# Patient Record
Sex: FEMALE | Race: BLACK OR AFRICAN AMERICAN | NOT HISPANIC OR LATINO | Employment: UNEMPLOYED | URBAN - METROPOLITAN AREA
[De-identification: names, ages, dates, MRNs, and addresses within clinical notes are randomized per-mention and may not be internally consistent; named-entity substitution may affect disease eponyms.]

---

## 2023-06-08 DIAGNOSIS — D72.829 LEUCOCYTOSIS: Primary | ICD-10-CM

## 2023-07-14 ENCOUNTER — OFFICE VISIT (OUTPATIENT)
Dept: HEMATOLOGY/ONCOLOGY | Facility: CLINIC | Age: 63
End: 2023-07-14
Payer: MEDICAID

## 2023-07-14 VITALS
HEIGHT: 65 IN | TEMPERATURE: 98 F | DIASTOLIC BLOOD PRESSURE: 73 MMHG | HEART RATE: 66 BPM | RESPIRATION RATE: 20 BRPM | SYSTOLIC BLOOD PRESSURE: 121 MMHG | WEIGHT: 235 LBS | OXYGEN SATURATION: 97 % | BODY MASS INDEX: 39.15 KG/M2

## 2023-07-14 DIAGNOSIS — D72.829 LEUCOCYTOSIS: Primary | ICD-10-CM

## 2023-07-14 PROCEDURE — 3074F PR MOST RECENT SYSTOLIC BLOOD PRESSURE < 130 MM HG: ICD-10-PCS | Mod: CPTII,,, | Performed by: INTERNAL MEDICINE

## 2023-07-14 PROCEDURE — 1160F PR REVIEW ALL MEDS BY PRESCRIBER/CLIN PHARMACIST DOCUMENTED: ICD-10-PCS | Mod: CPTII,,, | Performed by: INTERNAL MEDICINE

## 2023-07-14 PROCEDURE — 3078F PR MOST RECENT DIASTOLIC BLOOD PRESSURE < 80 MM HG: ICD-10-PCS | Mod: CPTII,,, | Performed by: INTERNAL MEDICINE

## 2023-07-14 PROCEDURE — 3008F BODY MASS INDEX DOCD: CPT | Mod: CPTII,,, | Performed by: INTERNAL MEDICINE

## 2023-07-14 PROCEDURE — 3008F PR BODY MASS INDEX (BMI) DOCUMENTED: ICD-10-PCS | Mod: CPTII,,, | Performed by: INTERNAL MEDICINE

## 2023-07-14 PROCEDURE — 99203 OFFICE O/P NEW LOW 30 MIN: CPT | Mod: ,,, | Performed by: INTERNAL MEDICINE

## 2023-07-14 PROCEDURE — 1160F RVW MEDS BY RX/DR IN RCRD: CPT | Mod: CPTII,,, | Performed by: INTERNAL MEDICINE

## 2023-07-14 PROCEDURE — 1159F MED LIST DOCD IN RCRD: CPT | Mod: CPTII,,, | Performed by: INTERNAL MEDICINE

## 2023-07-14 PROCEDURE — 4010F PR ACE/ARB THEARPY RXD/TAKEN: ICD-10-PCS | Mod: CPTII,,, | Performed by: INTERNAL MEDICINE

## 2023-07-14 PROCEDURE — 4010F ACE/ARB THERAPY RXD/TAKEN: CPT | Mod: CPTII,,, | Performed by: INTERNAL MEDICINE

## 2023-07-14 PROCEDURE — 1159F PR MEDICATION LIST DOCUMENTED IN MEDICAL RECORD: ICD-10-PCS | Mod: CPTII,,, | Performed by: INTERNAL MEDICINE

## 2023-07-14 PROCEDURE — 3074F SYST BP LT 130 MM HG: CPT | Mod: CPTII,,, | Performed by: INTERNAL MEDICINE

## 2023-07-14 PROCEDURE — 99203 PR OFFICE/OUTPT VISIT, NEW, LEVL III, 30-44 MIN: ICD-10-PCS | Mod: ,,, | Performed by: INTERNAL MEDICINE

## 2023-07-14 PROCEDURE — 3078F DIAST BP <80 MM HG: CPT | Mod: CPTII,,, | Performed by: INTERNAL MEDICINE

## 2023-07-14 RX ORDER — TRAZODONE HYDROCHLORIDE 50 MG/1
50 TABLET ORAL NIGHTLY PRN
COMMUNITY
Start: 2023-05-06

## 2023-07-14 RX ORDER — EMPAGLIFLOZIN 10 MG/1
10 TABLET, FILM COATED ORAL DAILY
COMMUNITY
Start: 2023-06-06

## 2023-07-14 RX ORDER — NAPROXEN SODIUM 220 MG/1
81 TABLET, FILM COATED ORAL DAILY
COMMUNITY

## 2023-07-14 RX ORDER — METFORMIN HYDROCHLORIDE 850 MG/1
850 TABLET ORAL 2 TIMES DAILY
COMMUNITY
Start: 2023-05-28

## 2023-07-14 RX ORDER — METOPROLOL TARTRATE 25 MG/1
25 TABLET, FILM COATED ORAL 2 TIMES DAILY
COMMUNITY
Start: 2023-05-31

## 2023-07-14 RX ORDER — GABAPENTIN 100 MG/1
100 CAPSULE ORAL 3 TIMES DAILY
COMMUNITY
Start: 2023-05-31

## 2023-07-14 RX ORDER — CITALOPRAM 40 MG/1
40 TABLET, FILM COATED ORAL DAILY
COMMUNITY
Start: 2023-05-06

## 2023-07-14 RX ORDER — GLIPIZIDE 5 MG/1
5 TABLET ORAL DAILY
COMMUNITY
Start: 2023-06-05

## 2023-07-14 RX ORDER — ATORVASTATIN CALCIUM 10 MG/1
10 TABLET, FILM COATED ORAL DAILY
COMMUNITY
Start: 2023-06-14

## 2023-07-14 RX ORDER — LINAGLIPTIN 5 MG/1
5 TABLET, FILM COATED ORAL DAILY
COMMUNITY
Start: 2023-05-30 | End: 2023-11-15

## 2023-07-14 RX ORDER — CHOLECALCIFEROL (VITAMIN D3) 25 MCG
1000 TABLET ORAL DAILY
COMMUNITY

## 2023-07-14 RX ORDER — SULINDAC 200 MG/1
200 TABLET ORAL 2 TIMES DAILY
COMMUNITY
Start: 2023-06-12

## 2023-07-14 RX ORDER — TAMOXIFEN CITRATE 10 MG/1
10 TABLET ORAL DAILY
COMMUNITY
Start: 2023-05-31

## 2023-07-14 RX ORDER — LOSARTAN POTASSIUM 50 MG/1
50 TABLET ORAL DAILY
COMMUNITY
Start: 2023-06-06

## 2023-07-14 RX ORDER — AMLODIPINE BESYLATE 10 MG/1
10 TABLET ORAL DAILY
COMMUNITY
Start: 2023-06-01

## 2023-07-14 RX ORDER — LANCETS 33 GAUGE
1 EACH MISCELLANEOUS DAILY
COMMUNITY
Start: 2023-02-15

## 2023-07-14 NOTE — PROGRESS NOTES
PATIENT: Calista Mckinnon  MRN: [id number]  DATE: 7/14/2023        Chief Complaint: Follow-up (Not complaints )      Oncologic History:      History     Treatment     Pathology     62yo presents for evaluation of mild lymphocytosis. She has not unexplained weight loss and no night sweats. She had no illnesses or infections at the time the CBCs were drawn. She has should pain and knee pain.Sh notes no adenopathy. She has occasional abdominal pain for which she takes an antacid which relieves it. Last colonoscopy 2019; no polyps then but had polyps on the previous colonoscopy.   She quit smoking in 2014. No history of splenectomy. She did have breast cancer in 2018, treated in [place]. She did not require radiation or antihormone; told it was early detection.     Labs:  6/7/23 WBC 12.2, HGB 12.5, MCV 88, , ALC 3.8  5/5/23 WBC 11.6, HGB 12.0, HGB 38.7, MCV 86, PLT 267K ALC 3.9, ANC 6.8, CMP unremarkable    Subjective:   Interval History: Ms. Mckinnon is a 63 y.o. female who returns for new evaluation and treatment of. lymphocytosis     Past Medical History:   Past Medical History:   Diagnosis Date    Breast cancer     COPD (chronic obstructive pulmonary disease)     Essential (primary) hypertension     Gastritis without bleeding     History of right breast cancer     Mixed hyperlipidemia     MAKENZIE (obstructive sleep apnea)     Type 2 diabetes mellitus with diabetic neuropathy, without long-term current use of insulin        Past Surgical HIstory: History reviewed. No pertinent surgical history.    Family History:   Family History   Problem Relation Age of Onset    Hypertension Mother     Hypertension Brother     Hypertension Maternal Grandmother     Breast cancer Daughter     Hypertension Daughter        Social History:  reports that she has never smoked. She has never used smokeless tobacco. She reports that she does not currently use alcohol. She reports that she does not use drugs.    Allergies:  Review of  "patient's allergies indicates:  No Known Allergies    Medications:  Current Outpatient Medications   Medication Sig Dispense Refill    amLODIPine (NORVASC) 10 MG tablet Take 10 mg by mouth Daily.      aspirin 81 MG Chew Take 81 mg by mouth Daily.      atorvastatin (LIPITOR) 10 MG tablet Take 10 mg by mouth Daily.      citalopram (CELEXA) 40 MG tablet Take 40 mg by mouth Daily.      gabapentin (NEURONTIN) 100 MG capsule Take 100 mg by mouth 3 (three) times daily.      glipiZIDE (GLUCOTROL) 5 MG tablet Take 5 mg by mouth Daily.      JARDIANCE 10 mg tablet Take 10 mg by mouth Daily.      losartan (COZAAR) 50 MG tablet Take 50 mg by mouth Daily.      metFORMIN (GLUCOPHAGE) 850 MG tablet Take 850 mg by mouth 2 (two) times a day.      metoprolol tartrate (LOPRESSOR) 25 MG tablet Take 25 mg by mouth 2 (two) times daily.      sulindac (CLINORIL) 200 MG Tab Take 200 mg by mouth 2 (two) times a day.      tamoxifen (NOLVADEX) 10 MG Tab Take 10 mg by mouth Daily.      TRADJENTA 5 mg Tab tablet Take 5 mg by mouth Daily.      traZODone (DESYREL) 50 MG tablet Take 50 mg by mouth nightly as needed.      TRUEPLUS LANCETS 33 gauge Misc Apply 1 lancet  topically Daily.      vitamin D (VITAMIN D3) 1000 units Tab Take 1,000 Units by mouth once daily.       No current facility-administered medications for this visit.       Review of Systems    ECOG Performance Status:   ECOG SCORE             Objective:      Vitals:   Vitals:    07/14/23 1138   BP: 121/73   BP Location: Left arm   Patient Position: Sitting   BP Method: Medium (Automatic)   Pulse: 66   Resp: 20   Temp: 98.4 °F (36.9 °C)   TempSrc: Oral   SpO2: 97%   Weight: 106.6 kg (235 lb)   Height: 5' 5" (1.651 m)     BMI: Body mass index is 39.11 kg/m².    Physical Exam    Laboratory Data:  No visits with results within 1 Week(s) from this visit.   Latest known visit with results is:   No results found for any previous visit.         Imaging:   Assessment:     1. Lymphocytosis    2. " Leucocytosis          Plan:     Problem List Items Addressed This Visit    None  Visit Diagnoses       Lymphocytosis    -  Primary    Leucocytosis            Peripheral blood flow cytometry for leukemia/lymphoma to assess for monoclonal vs polyclonal, ESR and CRP. RTC in 2-3 weeks to go over the results

## 2023-08-15 ENCOUNTER — OFFICE VISIT (OUTPATIENT)
Dept: HEMATOLOGY/ONCOLOGY | Facility: CLINIC | Age: 63
End: 2023-08-15
Payer: MEDICAID

## 2023-08-15 VITALS
HEART RATE: 78 BPM | OXYGEN SATURATION: 97 % | BODY MASS INDEX: 39.15 KG/M2 | TEMPERATURE: 98 F | HEIGHT: 65 IN | RESPIRATION RATE: 20 BRPM | SYSTOLIC BLOOD PRESSURE: 156 MMHG | WEIGHT: 235 LBS | DIASTOLIC BLOOD PRESSURE: 78 MMHG

## 2023-08-15 DIAGNOSIS — D72.829 LEUKOCYTOSIS, UNSPECIFIED TYPE: Primary | ICD-10-CM

## 2023-08-15 PROCEDURE — 1159F MED LIST DOCD IN RCRD: CPT | Mod: CPTII,,, | Performed by: INTERNAL MEDICINE

## 2023-08-15 PROCEDURE — 3008F PR BODY MASS INDEX (BMI) DOCUMENTED: ICD-10-PCS | Mod: CPTII,,, | Performed by: INTERNAL MEDICINE

## 2023-08-15 PROCEDURE — 3077F PR MOST RECENT SYSTOLIC BLOOD PRESSURE >= 140 MM HG: ICD-10-PCS | Mod: CPTII,,, | Performed by: INTERNAL MEDICINE

## 2023-08-15 PROCEDURE — 4010F PR ACE/ARB THEARPY RXD/TAKEN: ICD-10-PCS | Mod: CPTII,,, | Performed by: INTERNAL MEDICINE

## 2023-08-15 PROCEDURE — 1160F PR REVIEW ALL MEDS BY PRESCRIBER/CLIN PHARMACIST DOCUMENTED: ICD-10-PCS | Mod: CPTII,,, | Performed by: INTERNAL MEDICINE

## 2023-08-15 PROCEDURE — 3078F PR MOST RECENT DIASTOLIC BLOOD PRESSURE < 80 MM HG: ICD-10-PCS | Mod: CPTII,,, | Performed by: INTERNAL MEDICINE

## 2023-08-15 PROCEDURE — 99213 PR OFFICE/OUTPT VISIT, EST, LEVL III, 20-29 MIN: ICD-10-PCS | Mod: ,,, | Performed by: INTERNAL MEDICINE

## 2023-08-15 PROCEDURE — 1160F RVW MEDS BY RX/DR IN RCRD: CPT | Mod: CPTII,,, | Performed by: INTERNAL MEDICINE

## 2023-08-15 PROCEDURE — 3077F SYST BP >= 140 MM HG: CPT | Mod: CPTII,,, | Performed by: INTERNAL MEDICINE

## 2023-08-15 PROCEDURE — 99213 OFFICE O/P EST LOW 20 MIN: CPT | Mod: ,,, | Performed by: INTERNAL MEDICINE

## 2023-08-15 PROCEDURE — 3078F DIAST BP <80 MM HG: CPT | Mod: CPTII,,, | Performed by: INTERNAL MEDICINE

## 2023-08-15 PROCEDURE — 3008F BODY MASS INDEX DOCD: CPT | Mod: CPTII,,, | Performed by: INTERNAL MEDICINE

## 2023-08-15 PROCEDURE — 1159F PR MEDICATION LIST DOCUMENTED IN MEDICAL RECORD: ICD-10-PCS | Mod: CPTII,,, | Performed by: INTERNAL MEDICINE

## 2023-08-15 PROCEDURE — 4010F ACE/ARB THERAPY RXD/TAKEN: CPT | Mod: CPTII,,, | Performed by: INTERNAL MEDICINE

## 2023-08-15 NOTE — PROGRESS NOTES
[facility]  PROGRESS NOTE    Patient ID: Calista Mckinnon is a 63 y.o. female.  MRN: [id number]  : 1960    Subjective:      Chief Complaint: Follow-up (No complaints)    History of Present Illness:   She is she back here for first-time in office visit justin was seen repeat telemedicine the few weeks ago for lymphocytosis she is here to come to go over the test results  History:  Past Medical History:   Diagnosis Date    Breast cancer     COPD (chronic obstructive pulmonary disease)     Essential (primary) hypertension     Gastritis without bleeding     History of right breast cancer     Mixed hyperlipidemia     MAKENZIE (obstructive sleep apnea)     Type 2 diabetes mellitus with diabetic neuropathy, without long-term current use of insulin       History reviewed. No pertinent surgical history.  Family History   Problem Relation Age of Onset    Hypertension Mother     Hypertension Brother     Hypertension Maternal Grandmother     Breast cancer Daughter     Hypertension Daughter       Social History     Tobacco Use    Smoking status: Never    Smokeless tobacco: Never   Substance and Sexual Activity    Alcohol use: Not Currently    Drug use: Never    Sexual activity: Not on file      Allergies: Review of patient's allergies indicates:  No Known Allergies    Current medications:  Medication List with Changes/Refills   Current Medications    AMLODIPINE (NORVASC) 10 MG TABLET    Take 10 mg by mouth Daily.    ASPIRIN 81 MG CHEW    Take 81 mg by mouth Daily.    ATORVASTATIN (LIPITOR) 10 MG TABLET    Take 10 mg by mouth Daily.    CITALOPRAM (CELEXA) 40 MG TABLET    Take 40 mg by mouth Daily.    GABAPENTIN (NEURONTIN) 100 MG CAPSULE    Take 100 mg by mouth 3 (three) times daily.    GLIPIZIDE (GLUCOTROL) 5 MG TABLET    Take 5 mg by mouth Daily.    JARDIANCE 10 MG TABLET    Take 10 mg by mouth Daily.    LOSARTAN (COZAAR) 50 MG TABLET    Take 50 mg by mouth Daily.    METFORMIN (GLUCOPHAGE) 850 MG TABLET    Take 850 mg by  mouth 2 (two) times a day.    METOPROLOL TARTRATE (LOPRESSOR) 25 MG TABLET    Take 25 mg by mouth 2 (two) times daily.    SULINDAC (CLINORIL) 200 MG TAB    Take 200 mg by mouth 2 (two) times a day.    TAMOXIFEN (NOLVADEX) 10 MG TAB    Take 10 mg by mouth Daily.    TRADJENTA 5 MG TAB TABLET    Take 5 mg by mouth Daily.    TRAZODONE (DESYREL) 50 MG TABLET    Take 50 mg by mouth nightly as needed.    TRUEPLUS LANCETS 33 GAUGE MISC    Apply 1 lancet  topically Daily.    VITAMIN D (VITAMIN D3) 1000 UNITS TAB    Take 1,000 Units by mouth once daily.      ROS:   Review of Systems   Constitutional:  Negative for activity change, appetite change, chills, fatigue, fever and unexpected weight change.   HENT:  Negative for nasal congestion, facial swelling, hearing loss, mouth sores, nosebleeds, sore throat, trouble swallowing and voice change.    Eyes:  Negative for photophobia, pain, redness, itching and visual disturbance.   Respiratory:  Negative for apnea, cough, choking, chest tightness, shortness of breath and wheezing.    Cardiovascular:  Negative for chest pain, palpitations, leg swelling and claudication.   Gastrointestinal:  Negative for abdominal distention, abdominal pain, anal bleeding, blood in stool, change in bowel habit, constipation, diarrhea, nausea, rectal pain, vomiting, reflux and change in bowel habit.   Endocrine: Negative for cold intolerance and heat intolerance.   Genitourinary:  Negative for decreased urine volume, difficulty urinating, dysuria, enuresis, flank pain, frequency, hematuria, urgency and vaginal dryness.   Musculoskeletal:  Negative for arthralgias, back pain, gait problem, joint swelling, leg pain, myalgias and neck pain.   Integumentary:  Negative for color change, rash, wound, mole/lesion, breast mass, breast discharge and breast tenderness.   Allergic/Immunologic: Negative for food allergies and immunocompromised state.   Neurological:  Negative for dizziness, vertigo, tremors,  "seizures, syncope, facial asymmetry, speech difficulty, weakness, light-headedness, numbness, headaches, coordination difficulties, memory loss and coordination difficulties.   Hematological:  Negative for adenopathy. Does not bruise/bleed easily.   Psychiatric/Behavioral:  Negative for agitation, confusion, sleep disturbance and suicidal ideas. The patient is not nervous/anxious.    Breast: Negative for mass and tenderness    Objective:     Vitals:    08/15/23 0914   BP: (!) 156/78   Pulse: 78   Resp: 20   Temp: 97.6 °F (36.4 °C)   TempSrc: Oral   SpO2: 97%   Weight: 106.6 kg (235 lb)   Height: 5' 5" (1.651 m)   PainSc:   7   PainLoc: Shoulder     Wt Readings from Last 2 Encounters:   08/15/23 106.6 kg (235 lb)   07/14/23 106.6 kg (235 lb)     Physical Examination:   Physical Exam  Vitals and nursing note reviewed. Exam conducted with a chaperone present.   Constitutional:       Appearance: Normal appearance. She is obese.   HENT:      Mouth/Throat:      Mouth: Mucous membranes are moist.      Pharynx: Oropharynx is clear.   Eyes:      Pupils: Pupils are equal, round, and reactive to light.   Cardiovascular:      Rate and Rhythm: Normal rate and regular rhythm.      Pulses: Normal pulses.      Heart sounds: Normal heart sounds.   Pulmonary:      Effort: Pulmonary effort is normal.      Breath sounds: Normal breath sounds.   Abdominal:      General: Abdomen is flat. Bowel sounds are normal.      Palpations: Abdomen is soft.      Comments: Mild hepatomegaly   Skin:     General: Skin is warm.   Neurological:      Mental Status: She is alert.   Psychiatric:         Mood and Affect: Mood normal.         Behavior: Behavior normal.         Thought Content: Thought content normal.         Judgment: Judgment normal.       Diagnostic Tests:  Significant Imaging: I have reviewed and interpreted all pertinent imaging results/findings.    Laboratory Data:  All pertinent labs have been reviewed.    Labs:   No visits with results " within 1 Week(s) from this visit.   Latest known visit with results is:   No results found for any previous visit.       Assessment:   Mild leukocytosis     Absolute lymphocyte count below 5000     No lymphadenopathy pivot    She is known to have hepatomegaly     From my standpoint she has been reactive leukocytosis very unlikely to be of a primary hematological problem    Follow-up in 3 months and may be less frequently or p.r.n. after that    Orders:   No orders of the defined types were placed in this encounter.    Plan:   Follow-up in 3 months    Follow Up:   No follow-ups on file.    Plan was discussed with the patient at length, and she verbalized understanding. Calista was given an opportunity to ask questions that were answered to her satisfaction, and she was advised to call in the interval if any problems or questions arise.    Electronically signed by Omi Patel MD

## 2023-11-15 ENCOUNTER — OFFICE VISIT (OUTPATIENT)
Dept: HEMATOLOGY/ONCOLOGY | Facility: CLINIC | Age: 63
End: 2023-11-15
Payer: MEDICAID

## 2023-11-15 VITALS
SYSTOLIC BLOOD PRESSURE: 149 MMHG | HEIGHT: 65 IN | TEMPERATURE: 98 F | WEIGHT: 227 LBS | DIASTOLIC BLOOD PRESSURE: 76 MMHG | RESPIRATION RATE: 20 BRPM | OXYGEN SATURATION: 99 % | BODY MASS INDEX: 37.82 KG/M2 | HEART RATE: 78 BPM

## 2023-11-15 DIAGNOSIS — D69.6 THROMBOCYTOPENIA: ICD-10-CM

## 2023-11-15 DIAGNOSIS — D72.820 LYMPHOCYTOSIS: Primary | ICD-10-CM

## 2023-11-15 PROCEDURE — 1159F MED LIST DOCD IN RCRD: CPT | Mod: CPTII,,, | Performed by: NURSE PRACTITIONER

## 2023-11-15 PROCEDURE — 3077F SYST BP >= 140 MM HG: CPT | Mod: CPTII,,, | Performed by: NURSE PRACTITIONER

## 2023-11-15 PROCEDURE — 4010F ACE/ARB THERAPY RXD/TAKEN: CPT | Mod: CPTII,,, | Performed by: NURSE PRACTITIONER

## 2023-11-15 PROCEDURE — 3078F DIAST BP <80 MM HG: CPT | Mod: CPTII,,, | Performed by: NURSE PRACTITIONER

## 2023-11-15 PROCEDURE — 99214 OFFICE O/P EST MOD 30 MIN: CPT | Mod: ,,, | Performed by: NURSE PRACTITIONER

## 2023-11-15 PROCEDURE — 99214 PR OFFICE/OUTPT VISIT, EST, LEVL IV, 30-39 MIN: ICD-10-PCS | Mod: ,,, | Performed by: NURSE PRACTITIONER

## 2023-11-15 PROCEDURE — 3077F PR MOST RECENT SYSTOLIC BLOOD PRESSURE >= 140 MM HG: ICD-10-PCS | Mod: CPTII,,, | Performed by: NURSE PRACTITIONER

## 2023-11-15 PROCEDURE — 4010F PR ACE/ARB THEARPY RXD/TAKEN: ICD-10-PCS | Mod: CPTII,,, | Performed by: NURSE PRACTITIONER

## 2023-11-15 PROCEDURE — 3008F BODY MASS INDEX DOCD: CPT | Mod: CPTII,,, | Performed by: NURSE PRACTITIONER

## 2023-11-15 PROCEDURE — 3078F PR MOST RECENT DIASTOLIC BLOOD PRESSURE < 80 MM HG: ICD-10-PCS | Mod: CPTII,,, | Performed by: NURSE PRACTITIONER

## 2023-11-15 PROCEDURE — 3008F PR BODY MASS INDEX (BMI) DOCUMENTED: ICD-10-PCS | Mod: CPTII,,, | Performed by: NURSE PRACTITIONER

## 2023-11-15 PROCEDURE — 1159F PR MEDICATION LIST DOCUMENTED IN MEDICAL RECORD: ICD-10-PCS | Mod: CPTII,,, | Performed by: NURSE PRACTITIONER

## 2023-11-15 RX ORDER — ALBUTEROL SULFATE 90 UG/1
2 AEROSOL, METERED RESPIRATORY (INHALATION) EVERY 4 HOURS PRN
COMMUNITY
Start: 2023-10-09

## 2023-11-15 RX ORDER — ACETAMINOPHEN AND CODEINE PHOSPHATE 300; 30 MG/1; MG/1
1 TABLET ORAL EVERY 6 HOURS PRN
COMMUNITY
Start: 2023-09-10

## 2023-11-15 RX ORDER — CALCIUM CITRATE/VITAMIN D3 200MG-6.25
TABLET ORAL 2 TIMES DAILY
COMMUNITY
Start: 2023-11-03

## 2023-11-15 RX ORDER — TIRZEPATIDE 2.5 MG/.5ML
INJECTION, SOLUTION SUBCUTANEOUS
COMMUNITY
Start: 2023-11-06

## 2023-11-15 NOTE — PROGRESS NOTES
Cancer Center  PROGRESS NOTE    Patient ID: Calista Mckinnon is a 63 y.o. female.  MRN: [id number]  : 1960    Subjective:      Chief Complaint:  No problems today.      History of Present Illness:   She is she back here for first-time in office visit pivot was seen repeat telemedicine the few weeks ago for lymphocytosis she is here to come to go over the test results      Interval History:    11/15/2023:  Ms. Mckinnon is here today for her follow-up and lab review regarding lymphocytosis.  Today, Ms. Mckinnon reports feeling well.  She denies any frequent fevers, chills, infections, drenching-night sweats, and unintentional weight loss.  She denies any abnormal bleeding or excessive bruising.  She has lost 8 pounds after being placed on Monunjaro for her diabetes.  Labs reviewed with patient dated 2023:  Creatinine 0.6, liver enzymes WNL, WBC 11.4, Hgb 13.5, Hct 45.1, platelets 58,000,  lymphocytes 38.3.   Platelet count in 2023 (272,000) and 2023 (268,000).  Will have the patient repeat CBC in 2 weeks and return in 2024 for labs and office visit, or return in 2023 if platelet count abnormal.  Last US of the abdomen dated 2021, showed hepatomegaly with diffuse fatty infiltration of the liver.  Peripheral blood smear dated 2023 was normal.  She states, she did have a nose bleed three months ago, but no bleeding since that time.  She had a hospitalization in 2023 for a fall in the bath tub.  No recent infections or illnesses.    History:  Past Medical History:   Diagnosis Date    Breast cancer     COPD (chronic obstructive pulmonary disease)     Essential (primary) hypertension     Gastritis without bleeding     History of right breast cancer     Mixed hyperlipidemia     MAKENZIE (obstructive sleep apnea)     Type 2 diabetes mellitus with diabetic neuropathy, without long-term current use of insulin       History reviewed. No pertinent surgical history.  Family History    Problem Relation Age of Onset    Hypertension Mother     Hypertension Brother     Hypertension Maternal Grandmother     Breast cancer Daughter     Hypertension Daughter       Social History     Tobacco Use    Smoking status: Never    Smokeless tobacco: Never   Substance and Sexual Activity    Alcohol use: Not Currently    Drug use: Never    Sexual activity: Not on file      Allergies: Review of patient's allergies indicates:  No Known Allergies    Current medications:  Medication List with Changes/Refills   Current Medications    ACETAMINOPHEN-CODEINE 300-30MG (TYLENOL #3) 300-30 MG TAB    Take 1 tablet by mouth every 6 (six) hours as needed.    ALBUTEROL (PROVENTIL/VENTOLIN HFA) 90 MCG/ACTUATION INHALER    2 puffs every 4 (four) hours as needed.    AMLODIPINE (NORVASC) 10 MG TABLET    Take 10 mg by mouth Daily.    ASPIRIN 81 MG CHEW    Take 81 mg by mouth Daily.    ATORVASTATIN (LIPITOR) 10 MG TABLET    Take 10 mg by mouth Daily.    CITALOPRAM (CELEXA) 40 MG TABLET    Take 40 mg by mouth Daily.    GABAPENTIN (NEURONTIN) 100 MG CAPSULE    Take 100 mg by mouth 3 (three) times daily.    GLIPIZIDE (GLUCOTROL) 5 MG TABLET    Take 5 mg by mouth Daily.    JARDIANCE 10 MG TABLET    Take 10 mg by mouth Daily.    LOSARTAN (COZAAR) 50 MG TABLET    Take 50 mg by mouth Daily.    METFORMIN (GLUCOPHAGE) 850 MG TABLET    Take 850 mg by mouth 2 (two) times a day.    METOPROLOL TARTRATE (LOPRESSOR) 25 MG TABLET    Take 25 mg by mouth 2 (two) times daily.    MOUNJARO 2.5 MG/0.5 ML PNIJ    SMARTSI.5 Milligram(s) SUB-Q Once a Week    SULINDAC (CLINORIL) 200 MG TAB    Take 200 mg by mouth 2 (two) times a day.    TAMOXIFEN (NOLVADEX) 10 MG TAB    Take 10 mg by mouth Daily.    TRAZODONE (DESYREL) 50 MG TABLET    Take 50 mg by mouth nightly as needed.    TRUE METRIX GLUCOSE TEST STRIP STRP    2 (two) times daily.    TRUEPLUS LANCETS 33 GAUGE MISC    Apply 1 lancet  topically Daily.    VITAMIN D (VITAMIN D3) 1000 UNITS TAB    Take  1,000 Units by mouth once daily.   Discontinued Medications    TRADJENTA 5 MG TAB TABLET    Take 5 mg by mouth Daily.      ROS:   Review of Systems   Constitutional:  Negative for activity change, appetite change, chills, fatigue, fever and unexpected weight change.   HENT:  Negative for nasal congestion, facial swelling, hearing loss, mouth sores, nosebleeds, sore throat, trouble swallowing and voice change.    Eyes:  Negative for photophobia, pain, redness, itching and visual disturbance.   Respiratory:  Negative for apnea, cough, choking, chest tightness, shortness of breath and wheezing.    Cardiovascular:  Negative for chest pain, palpitations, leg swelling and claudication.   Gastrointestinal:  Negative for abdominal distention, abdominal pain, anal bleeding, blood in stool, change in bowel habit, constipation, diarrhea, nausea, rectal pain, vomiting and reflux.   Endocrine: Negative for cold intolerance and heat intolerance.   Genitourinary:  Negative for decreased urine volume, difficulty urinating, dysuria, enuresis, flank pain, frequency, hematuria, urgency and vaginal dryness.   Musculoskeletal:  Negative for arthralgias, back pain, gait problem, joint swelling, leg pain, myalgias and neck pain.   Integumentary:  Negative for color change, rash, wound, mole/lesion, breast mass, breast discharge and breast tenderness.   Allergic/Immunologic: Negative for food allergies and immunocompromised state.   Neurological:  Negative for dizziness, vertigo, tremors, seizures, syncope, facial asymmetry, speech difficulty, weakness, light-headedness, numbness, headaches, memory loss and coordination difficulties.   Hematological:  Negative for adenopathy. Does not bruise/bleed easily.   Psychiatric/Behavioral:  Negative for agitation, confusion, sleep disturbance and suicidal ideas. The patient is not nervous/anxious.    Breast: Negative for mass and tenderness    Objective:     Vitals:    11/15/23 0831   BP: (!)  "149/76   Pulse: 78   Resp: 20   Temp: 98.1 °F (36.7 °C)   TempSrc: Oral   SpO2: 99%   Weight: 103 kg (227 lb)   Height: 5' 5" (1.651 m)   PainSc:   7   PainLoc: Arm     Wt Readings from Last 2 Encounters:   11/15/23 103 kg (227 lb)   08/15/23 106.6 kg (235 lb)     Physical Examination:   Physical Exam  Vitals and nursing note reviewed. Exam conducted with a chaperone present.   Constitutional:       Appearance: Normal appearance. She is obese.   HENT:      Mouth/Throat:      Mouth: Mucous membranes are moist.      Pharynx: Oropharynx is clear.   Eyes:      Pupils: Pupils are equal, round, and reactive to light.   Cardiovascular:      Rate and Rhythm: Normal rate and regular rhythm.      Pulses: Normal pulses.      Heart sounds: Normal heart sounds.   Pulmonary:      Effort: Pulmonary effort is normal.      Breath sounds: Normal breath sounds.   Abdominal:      General: Abdomen is flat. Bowel sounds are normal.      Palpations: Abdomen is soft.      Comments: Mild hepatomegaly   Skin:     General: Skin is warm.   Neurological:      Mental Status: She is alert.   Psychiatric:         Mood and Affect: Mood normal.         Behavior: Behavior normal.         Thought Content: Thought content normal.         Judgment: Judgment normal.       Diagnostic Tests:  Significant Imaging: I have reviewed and interpreted all pertinent imaging results/findings.    Laboratory Data:  All pertinent labs have been reviewed.    Labs:   No visits with results within 1 Week(s) from this visit.   Latest known visit with results is:   No results found for any previous visit.       Assessment:   Mild leukocytosis     Absolute lymphocyte count below 5000     No lymphadenopathy pivot    She is known to have hepatomegaly     From my standpoint she has been reactive leukocytosis very unlikely to be of a primary hematological problem    Follow-up in 3 months and may be less frequently or p.r.n. after that    11/15/2023 Assessment:  Patient reports " feeling well.  WBC 11.4 today with normal lymphocytes 38.3.  Platelets are low at 58,000.  No abnormal bleeding or excessive bruising.  Platelets WNL last two visits to our office.  Hepatomegaly and diffuse fatty liver on US of the abdomen.  No new medications with the exception of Mounjaro, which was started in September 2023.   PDF for Mounjaro does not list thrombocytopenia as a side effect.    Orders:     11/15/2023 Plan:    Will have patient repeat CBC in 2 weeks.  Will have patient return to our office in Jan 2024 (patient out-of-town in December) if platelet count continues to be abnormal, in December if lower than 58,000.  Call our office for any abnormal bleeding or bruising.    Plan:

## 2024-01-25 NOTE — PROGRESS NOTES
[facility]  PROGRESS NOTE    Patient ID: Calista Mckinnon is a 63 y.o. female.  MRN: [id number]  : 1960    Subjective:      Chief Complaint:  No problems today.      History of Present Illness:   She is she back here for first-time in office visit pivmathew was seen repeat telemedicine the few weeks ago for lymphocytosis she is here to come to go over the test results      Interval History:    24:  Ms. Mckinnon is here today for her follow-up and lab review regarding lymphocytosis.  Today, Ms. Mckinnon reports feeling well.  She denies any frequent fevers, chills, infections, drenching-night sweats, and unintentional weight loss.  She denies any abnormal bleeding or excessive bruising.  Platelet count is 269,000 today    History:  Past Medical History:   Diagnosis Date    Breast cancer     COPD (chronic obstructive pulmonary disease)     Essential (primary) hypertension     Gastritis without bleeding     History of right breast cancer     Mixed hyperlipidemia     MAKENZIE (obstructive sleep apnea)     Type 2 diabetes mellitus with diabetic neuropathy, without long-term current use of insulin       Past Surgical History:   Procedure Laterality Date    BREAST BIOPSY Right     CHOLECYSTECTOMY      HYSTERECTOMY      MASTECTOMY Right 2018    MULTIPLE TOOTH EXTRACTIONS      TUBAL LIGATION       Family History   Problem Relation Age of Onset    Hypertension Mother     Hypertension Brother     Hypertension Maternal Grandmother     Breast cancer Daughter     Hypertension Daughter       Social History     Tobacco Use    Smoking status: Never    Smokeless tobacco: Never   Substance and Sexual Activity    Alcohol use: Not Currently    Drug use: Never    Sexual activity: Not on file      Allergies: Review of patient's allergies indicates:  No Known Allergies    Current medications:  Medication List with Changes/Refills   Current Medications    ACETAMINOPHEN-CODEINE 300-30MG (TYLENOL #3) 300-30 MG TAB    Take 1 tablet by mouth  every 6 (six) hours as needed.    ALBUTEROL (PROVENTIL/VENTOLIN HFA) 90 MCG/ACTUATION INHALER    2 puffs every 4 (four) hours as needed.    AMLODIPINE (NORVASC) 10 MG TABLET    Take 10 mg by mouth Daily.    ASPIRIN 81 MG CHEW    Take 81 mg by mouth Daily.    ATORVASTATIN (LIPITOR) 10 MG TABLET    Take 10 mg by mouth Daily.    CITALOPRAM (CELEXA) 40 MG TABLET    Take 40 mg by mouth Daily.    GABAPENTIN (NEURONTIN) 100 MG CAPSULE    Take 100 mg by mouth 3 (three) times daily.    GLIPIZIDE (GLUCOTROL) 5 MG TABLET    Take 5 mg by mouth Daily.    JARDIANCE 10 MG TABLET    Take 10 mg by mouth Daily.    LOSARTAN (COZAAR) 50 MG TABLET    Take 50 mg by mouth Daily.    METFORMIN (GLUCOPHAGE) 850 MG TABLET    Take 850 mg by mouth 2 (two) times a day.    METOPROLOL TARTRATE (LOPRESSOR) 25 MG TABLET    Take 25 mg by mouth 2 (two) times daily.    MOUNJARO 2.5 MG/0.5 ML PNIJ    SMARTSI.5 Milligram(s) SUB-Q Once a Week    SULINDAC (CLINORIL) 200 MG TAB    Take 200 mg by mouth 2 (two) times a day.    TAMOXIFEN (NOLVADEX) 10 MG TAB    Take 10 mg by mouth Daily.    TRAZODONE (DESYREL) 50 MG TABLET    Take 50 mg by mouth nightly as needed.    TRUE METRIX GLUCOSE TEST STRIP STRP    2 (two) times daily.    TRUEPLUS LANCETS 33 GAUGE MISC    Apply 1 lancet  topically Daily.    VITAMIN D (VITAMIN D3) 1000 UNITS TAB    Take 1,000 Units by mouth once daily.      ROS:   Review of Systems   Constitutional:  Negative for activity change, appetite change, chills, fatigue, fever and unexpected weight change.   HENT:  Negative for nasal congestion, facial swelling, hearing loss, mouth sores, nosebleeds, sore throat, trouble swallowing and voice change.    Eyes:  Negative for photophobia, pain, redness, itching and visual disturbance.   Respiratory:  Negative for apnea, cough, choking, chest tightness, shortness of breath and wheezing.    Cardiovascular:  Negative for chest pain, palpitations, leg swelling and claudication.   Gastrointestinal:   "Negative for abdominal distention, abdominal pain, anal bleeding, blood in stool, change in bowel habit, constipation, diarrhea, nausea, rectal pain, vomiting and reflux.   Endocrine: Negative for cold intolerance and heat intolerance.   Genitourinary:  Negative for decreased urine volume, difficulty urinating, dysuria, enuresis, flank pain, frequency, hematuria, urgency and vaginal dryness.   Musculoskeletal:  Negative for arthralgias, back pain, gait problem, joint swelling, leg pain, myalgias and neck pain.   Integumentary:  Negative for color change, rash, wound, mole/lesion, breast mass, breast discharge and breast tenderness.   Allergic/Immunologic: Negative for food allergies and immunocompromised state.   Neurological:  Negative for dizziness, vertigo, tremors, seizures, syncope, facial asymmetry, speech difficulty, weakness, light-headedness, numbness, headaches, memory loss and coordination difficulties.   Hematological:  Negative for adenopathy. Does not bruise/bleed easily.   Psychiatric/Behavioral:  Negative for agitation, confusion, sleep disturbance and suicidal ideas. The patient is not nervous/anxious.    Breast: Negative for mass and tenderness    Objective:     Vitals:    01/29/24 1115   BP: 135/78   Pulse: 68   Resp: 20   Temp: 98.2 °F (36.8 °C)   TempSrc: Oral   SpO2: 98%   Weight: 104.6 kg (230 lb 11.2 oz)   Height: 5' 5" (1.651 m)       Wt Readings from Last 2 Encounters:   01/29/24 104.6 kg (230 lb 11.2 oz)   11/15/23 103 kg (227 lb)     Physical Examination:   Physical Exam  Vitals and nursing note reviewed. Exam conducted with a chaperone present.   Constitutional:       Appearance: Normal appearance. She is obese.   HENT:      Mouth/Throat:      Mouth: Mucous membranes are moist.      Pharynx: Oropharynx is clear.   Eyes:      Pupils: Pupils are equal, round, and reactive to light.   Cardiovascular:      Rate and Rhythm: Normal rate and regular rhythm.      Pulses: Normal pulses.      " Heart sounds: Normal heart sounds.   Pulmonary:      Effort: Pulmonary effort is normal.      Breath sounds: Normal breath sounds.   Abdominal:      General: Abdomen is flat. Bowel sounds are normal.      Palpations: Abdomen is soft.      Comments: Mild hepatomegaly   Skin:     General: Skin is warm.   Neurological:      Mental Status: She is alert.   Psychiatric:         Mood and Affect: Mood normal.         Behavior: Behavior normal.         Thought Content: Thought content normal.         Judgment: Judgment normal.       Diagnostic Tests:  Significant Imaging: I have reviewed and interpreted all pertinent imaging results/findings.    Laboratory Data:  1/26/24 Gluc 183, BN/CR 21 ALT 32, AST 45, WBC 12.1, HGB 12.3, ,000  11/30/23 WBC 11.3, HGB 12.8, ,000  11/15/23 CMP WNL, WBC 11.4, Hgb 13.5, Plt 58,000    Labs:   No visits with results within 1 Week(s) from this visit.   Latest known visit with results is:   No results found for any previous visit.       Assessment:   Mild leukocytosis  She is known to have hepatomegaly   From my standpoint she has been reactive leukocytosis very unlikely to be of a primary hematological problem  Follow-up in 3 months and may be less frequently or p.r.n. after that    11/15/2023 Assessment:  Patient reports feeling well.  WBC 11.4 today with normal lymphocytes 38.3.  Platelets are low at 58,000.  No abnormal bleeding or excessive bruising.  Platelets WNL last two visits to our office.  Hepatomegaly and diffuse fatty liver on US of the abdomen.  No new medications with the exception of Mounjaro, which was started in September 2023.   PDF for Mounjaro does not list thrombocytopenia as a side effect.      Plan:   1/29/24 Plan:    RTC 3 months with CBC CMP  Platelets have stabilized and returned to normal levels since last visit  Call our office for any abnormal bleeding or bruising.    Veirto LAWRENCE

## 2024-01-29 ENCOUNTER — OFFICE VISIT (OUTPATIENT)
Dept: HEMATOLOGY/ONCOLOGY | Facility: CLINIC | Age: 64
End: 2024-01-29
Payer: MEDICAID

## 2024-01-29 VITALS
HEART RATE: 68 BPM | RESPIRATION RATE: 20 BRPM | OXYGEN SATURATION: 98 % | BODY MASS INDEX: 38.44 KG/M2 | TEMPERATURE: 98 F | DIASTOLIC BLOOD PRESSURE: 78 MMHG | HEIGHT: 65 IN | WEIGHT: 230.69 LBS | SYSTOLIC BLOOD PRESSURE: 135 MMHG

## 2024-01-29 DIAGNOSIS — D69.6 THROMBOCYTOPENIA: Primary | ICD-10-CM

## 2024-01-29 PROCEDURE — 3078F DIAST BP <80 MM HG: CPT | Mod: CPTII,,,

## 2024-01-29 PROCEDURE — 1160F RVW MEDS BY RX/DR IN RCRD: CPT | Mod: CPTII,,,

## 2024-01-29 PROCEDURE — 3008F BODY MASS INDEX DOCD: CPT | Mod: CPTII,,,

## 2024-01-29 PROCEDURE — 3075F SYST BP GE 130 - 139MM HG: CPT | Mod: CPTII,,,

## 2024-01-29 PROCEDURE — 1159F MED LIST DOCD IN RCRD: CPT | Mod: CPTII,,,

## 2024-01-29 PROCEDURE — 99214 OFFICE O/P EST MOD 30 MIN: CPT | Mod: ,,,

## 2024-01-29 PROCEDURE — 4010F ACE/ARB THERAPY RXD/TAKEN: CPT | Mod: CPTII,,,

## 2024-05-13 ENCOUNTER — OFFICE VISIT (OUTPATIENT)
Dept: HEMATOLOGY/ONCOLOGY | Facility: CLINIC | Age: 64
End: 2024-05-13
Payer: MEDICAID

## 2024-05-13 VITALS
HEIGHT: 65 IN | OXYGEN SATURATION: 97 % | SYSTOLIC BLOOD PRESSURE: 134 MMHG | HEART RATE: 71 BPM | RESPIRATION RATE: 18 BRPM | BODY MASS INDEX: 37.85 KG/M2 | WEIGHT: 227.19 LBS | DIASTOLIC BLOOD PRESSURE: 73 MMHG | TEMPERATURE: 99 F

## 2024-05-13 DIAGNOSIS — D69.6 THROMBOCYTOPENIA: Primary | ICD-10-CM

## 2024-05-13 DIAGNOSIS — D72.820 LYMPHOCYTOSIS: ICD-10-CM

## 2024-05-13 PROCEDURE — 3078F DIAST BP <80 MM HG: CPT | Mod: CPTII,,, | Performed by: NURSE PRACTITIONER

## 2024-05-13 PROCEDURE — 1160F RVW MEDS BY RX/DR IN RCRD: CPT | Mod: CPTII,,, | Performed by: NURSE PRACTITIONER

## 2024-05-13 PROCEDURE — 1159F MED LIST DOCD IN RCRD: CPT | Mod: CPTII,,, | Performed by: NURSE PRACTITIONER

## 2024-05-13 PROCEDURE — 3075F SYST BP GE 130 - 139MM HG: CPT | Mod: CPTII,,, | Performed by: NURSE PRACTITIONER

## 2024-05-13 PROCEDURE — 99213 OFFICE O/P EST LOW 20 MIN: CPT | Mod: ,,, | Performed by: NURSE PRACTITIONER

## 2024-05-13 PROCEDURE — 4010F ACE/ARB THERAPY RXD/TAKEN: CPT | Mod: CPTII,,, | Performed by: NURSE PRACTITIONER

## 2024-05-13 PROCEDURE — 3008F BODY MASS INDEX DOCD: CPT | Mod: CPTII,,, | Performed by: NURSE PRACTITIONER

## 2024-05-13 NOTE — PROGRESS NOTES
[facility]  PROGRESS NOTE    Patient ID: Calista Mckinnon is a 64 y.o. female.  MRN: [id number]  : 1960    Subjective:      Chief Complaint:  No problems today.      History of Present Illness:   She is she back here for first-time in office visit justin was seen repeat telemedicine the few weeks ago for lymphocytosis she is here to come to go over the test results      Interval History:    2024:  Ms. Mckinnon is here today for her follow-up and lab review regarding lymphocytosis. Today, Ms. Mckinnon reports feeling well. She denies any frequent fevers, chills, infections, drenching-night sweats, and unintentional weight loss. She denies any abnormal bleeding or excessive bruising.  Labs reviewed with patient dated 2024:  Creatinine 0.7, Liver enzymes and T. Bili WNL, WBC 13.9, Hgb 13.3, Hct 42.4, Plt 281,000, lymphocytes 29.1.  Weight is stable.  Eating and drinking.  No recent infections or illnesses.       History:  Past Medical History:   Diagnosis Date    Breast cancer     COPD (chronic obstructive pulmonary disease)     Essential (primary) hypertension     Gastritis without bleeding     History of right breast cancer     Mixed hyperlipidemia     MAKENZIE (obstructive sleep apnea)     Type 2 diabetes mellitus with diabetic neuropathy, without long-term current use of insulin       Past Surgical History:   Procedure Laterality Date    BREAST BIOPSY Right     CHOLECYSTECTOMY      HYSTERECTOMY      MASTECTOMY Right 2018    MULTIPLE TOOTH EXTRACTIONS      TUBAL LIGATION       Family History   Problem Relation Name Age of Onset    Hypertension Mother      Hypertension Brother      Hypertension Maternal Grandmother      Breast cancer Daughter      Hypertension Daughter        Social History     Tobacco Use    Smoking status: Never    Smokeless tobacco: Never   Substance and Sexual Activity    Alcohol use: Not Currently    Drug use: Never    Sexual activity: Not on file      Allergies: Review of patient's  allergies indicates:  No Known Allergies    Current medications:  Medication List with Changes/Refills   Current Medications    ACETAMINOPHEN-CODEINE 300-30MG (TYLENOL #3) 300-30 MG TAB    Take 1 tablet by mouth every 6 (six) hours as needed.    ALBUTEROL (PROVENTIL/VENTOLIN HFA) 90 MCG/ACTUATION INHALER    2 puffs every 4 (four) hours as needed.    AMLODIPINE (NORVASC) 10 MG TABLET    Take 10 mg by mouth Daily.    ASPIRIN 81 MG CHEW    Take 81 mg by mouth Daily.    ATORVASTATIN (LIPITOR) 10 MG TABLET    Take 10 mg by mouth Daily.    CITALOPRAM (CELEXA) 40 MG TABLET    Take 40 mg by mouth Daily.    GABAPENTIN (NEURONTIN) 100 MG CAPSULE    Take 100 mg by mouth 3 (three) times daily.    GLIPIZIDE (GLUCOTROL) 5 MG TABLET    Take 5 mg by mouth Daily.    JARDIANCE 10 MG TABLET    Take 10 mg by mouth Daily.    LOSARTAN (COZAAR) 50 MG TABLET    Take 50 mg by mouth Daily.    METFORMIN (GLUCOPHAGE) 850 MG TABLET    Take 850 mg by mouth 2 (two) times a day.    METOPROLOL TARTRATE (LOPRESSOR) 25 MG TABLET    Take 25 mg by mouth 2 (two) times daily.    MOUNJARO 2.5 MG/0.5 ML PNIJ    SMARTSI.5 Milligram(s) SUB-Q Once a Week    SULINDAC (CLINORIL) 200 MG TAB    Take 200 mg by mouth 2 (two) times a day.    TAMOXIFEN (NOLVADEX) 10 MG TAB    Take 10 mg by mouth Daily.    TRAZODONE (DESYREL) 50 MG TABLET    Take 50 mg by mouth nightly as needed.    TRUE METRIX GLUCOSE TEST STRIP STRP    2 (two) times daily.    TRUEPLUS LANCETS 33 GAUGE MISC    Apply 1 lancet  topically Daily.    VITAMIN D (VITAMIN D3) 1000 UNITS TAB    Take 1,000 Units by mouth once daily.      ROS:   Review of Systems   Constitutional:  Negative for activity change, appetite change, chills, fatigue, fever and unexpected weight change.   HENT:  Negative for nasal congestion, facial swelling, hearing loss, mouth sores, nosebleeds, sore throat, trouble swallowing and voice change.    Eyes:  Negative for photophobia, pain, redness, itching and visual disturbance.  "  Respiratory:  Negative for apnea, cough, choking, chest tightness, shortness of breath and wheezing.    Cardiovascular:  Negative for chest pain, palpitations, leg swelling and claudication.   Gastrointestinal:  Negative for abdominal distention, abdominal pain, anal bleeding, blood in stool, change in bowel habit, constipation, diarrhea, nausea, rectal pain, vomiting and reflux.   Endocrine: Negative for cold intolerance and heat intolerance.   Genitourinary:  Negative for decreased urine volume, difficulty urinating, dysuria, enuresis, flank pain, frequency, hematuria, urgency and vaginal dryness.   Musculoskeletal:  Negative for arthralgias, back pain, gait problem, joint swelling, leg pain, myalgias and neck pain.   Integumentary:  Negative for color change, rash, wound, mole/lesion, breast mass, breast discharge and breast tenderness.   Allergic/Immunologic: Negative for food allergies and immunocompromised state.   Neurological:  Negative for dizziness, vertigo, tremors, seizures, syncope, facial asymmetry, speech difficulty, weakness, light-headedness, numbness, headaches, memory loss and coordination difficulties.   Hematological:  Negative for adenopathy. Does not bruise/bleed easily.   Psychiatric/Behavioral:  Negative for agitation, confusion, sleep disturbance and suicidal ideas. The patient is not nervous/anxious.    Breast: Negative for mass and tenderness    Objective:     Vitals:    05/13/24 1104   BP: 134/73   Pulse: 71   Resp: 18   Temp: 98.5 °F (36.9 °C)   TempSrc: Oral   SpO2: 97%   Weight: 103.1 kg (227 lb 3.2 oz)   Height: 5' 5" (1.651 m)   PainSc: 0-No pain         Wt Readings from Last 2 Encounters:   05/13/24 103.1 kg (227 lb 3.2 oz)   01/29/24 104.6 kg (230 lb 11.2 oz)     Physical Examination:   Physical Exam  Vitals and nursing note reviewed. Exam conducted with a chaperone present.   Constitutional:       Appearance: Normal appearance. She is obese.   HENT:      Mouth/Throat:      " Mouth: Mucous membranes are moist.      Pharynx: Oropharynx is clear.   Eyes:      Pupils: Pupils are equal, round, and reactive to light.   Cardiovascular:      Rate and Rhythm: Normal rate and regular rhythm.      Pulses: Normal pulses.      Heart sounds: Normal heart sounds.   Pulmonary:      Effort: Pulmonary effort is normal.      Breath sounds: Normal breath sounds.   Abdominal:      General: Abdomen is flat. Bowel sounds are normal.      Palpations: Abdomen is soft.      Comments: Mild hepatomegaly   Skin:     General: Skin is warm.   Neurological:      Mental Status: She is alert.   Psychiatric:         Mood and Affect: Mood normal.         Behavior: Behavior normal.         Thought Content: Thought content normal.         Judgment: Judgment normal.       Diagnostic Tests:  Significant Imaging: I have reviewed and interpreted all pertinent imaging results/findings.    Laboratory Data:  1/26/24 Gluc 183, BN/CR 21 ALT 32, AST 45, WBC 12.1, HGB 12.3, ,000  11/30/23 WBC 11.3, HGB 12.8, ,000  11/15/23 CMP WNL, WBC 11.4, Hgb 13.5, Plt 58,000    Labs:   No visits with results within 1 Week(s) from this visit.   Latest known visit with results is:   No results found for any previous visit.       Assessment:   Mild leukocytosis  She is known to have hepatomegaly   From my standpoint she has been reactive leukocytosis very unlikely to be of a primary hematological problem  Follow-up in 3 months and may be less frequently or p.r.n. after that    11/15/2023 Assessment:  Patient reports feeling well.  WBC 11.4 today with normal lymphocytes 38.3.  Platelets are low at 58,000.  No abnormal bleeding or excessive bruising.  Platelets WNL last two visits to our office.  Hepatomegaly and diffuse fatty liver on US of the abdomen.  No new medications with the exception of Mounjaro, which was started in September 2023.   PDF for Mounjaro does not list thrombocytopenia as a side effect.      Plan:   Patient advised  to contact our office for He denies any epistaxis, gingival bleeding, hemoptysis, hematemesis,hematuria, hematochezia, melena, excessive bruising, swollen lymph nodes, fever, night sweats fatigue, weakness, chills, weight loss, abdominal pain, loss of appetite, SOB frequent infections.  Platelets have stabilized and returned to normal levels since last visit  Patient return to clinic prn.    The patient is in agreement with today's plan of care.  All questions answered.  Patient is aware to contact our office for any problems or concerns prior to next visit.      Kamila Benjamin, MSN-ED, APRN, AGACNP-BC, OCN